# Patient Record
Sex: MALE | Race: WHITE
[De-identification: names, ages, dates, MRNs, and addresses within clinical notes are randomized per-mention and may not be internally consistent; named-entity substitution may affect disease eponyms.]

---

## 2018-05-20 ENCOUNTER — HOSPITAL ENCOUNTER (EMERGENCY)
Age: 28
Discharge: HOME | End: 2018-05-20

## 2018-05-20 ENCOUNTER — HOSPITAL ENCOUNTER (EMERGENCY)
Dept: HOSPITAL 91 - E/R | Age: 28
Discharge: HOME | End: 2018-05-20
Payer: COMMERCIAL

## 2018-05-20 DIAGNOSIS — J02.0: Primary | ICD-10-CM

## 2018-05-20 PROCEDURE — 99283 EMERGENCY DEPT VISIT LOW MDM: CPT

## 2019-04-11 ENCOUNTER — HOSPITAL ENCOUNTER (EMERGENCY)
Dept: HOSPITAL 91 - FTE | Age: 29
Discharge: LEFT BEFORE DISCHARGE | End: 2019-04-11
Payer: SELF-PAY

## 2019-04-11 ENCOUNTER — HOSPITAL ENCOUNTER (EMERGENCY)
Dept: HOSPITAL 10 - FTE | Age: 29
Discharge: LEFT BEFORE DISCHARGE | End: 2019-04-11
Payer: SELF-PAY

## 2019-04-11 VITALS
BODY MASS INDEX: 41.75 KG/M2 | HEIGHT: 73 IN | WEIGHT: 315 LBS | HEIGHT: 73 IN | BODY MASS INDEX: 41.75 KG/M2 | WEIGHT: 315 LBS

## 2019-04-11 VITALS — SYSTOLIC BLOOD PRESSURE: 141 MMHG | DIASTOLIC BLOOD PRESSURE: 64 MMHG | RESPIRATION RATE: 18 BRPM | HEART RATE: 88 BPM

## 2019-04-11 DIAGNOSIS — Z53.21: Primary | ICD-10-CM

## 2019-04-11 PROCEDURE — 93005 ELECTROCARDIOGRAM TRACING: CPT

## 2019-06-15 ENCOUNTER — HOSPITAL ENCOUNTER (EMERGENCY)
Dept: HOSPITAL 91 - E/R | Age: 29
LOS: 1 days | Discharge: HOME | End: 2019-06-16
Payer: COMMERCIAL

## 2019-06-15 ENCOUNTER — HOSPITAL ENCOUNTER (EMERGENCY)
Dept: HOSPITAL 10 - E/R | Age: 29
LOS: 1 days | Discharge: HOME | End: 2019-06-16
Payer: COMMERCIAL

## 2019-06-15 VITALS
BODY MASS INDEX: 41.34 KG/M2 | WEIGHT: 311.95 LBS | BODY MASS INDEX: 41.34 KG/M2 | WEIGHT: 311.95 LBS | HEIGHT: 73 IN | HEIGHT: 73 IN

## 2019-06-15 DIAGNOSIS — R10.13: Primary | ICD-10-CM

## 2019-06-15 PROCEDURE — 93005 ELECTROCARDIOGRAM TRACING: CPT

## 2019-06-15 PROCEDURE — 71046 X-RAY EXAM CHEST 2 VIEWS: CPT

## 2019-06-15 PROCEDURE — 99284 EMERGENCY DEPT VISIT MOD MDM: CPT

## 2019-06-16 VITALS — DIASTOLIC BLOOD PRESSURE: 72 MMHG | HEART RATE: 63 BPM | SYSTOLIC BLOOD PRESSURE: 122 MMHG | RESPIRATION RATE: 16 BRPM

## 2019-06-16 RX ADMIN — FAMOTIDINE 1 MG: 20 TABLET ORAL at 00:55

## 2019-06-16 RX ADMIN — ALUMINUM HYDROXIDE, MAGNESIUM HYDROXIDE, DIMETHICONE 1 ML: 200; 200; 20 SUSPENSION ORAL at 00:55

## 2019-06-16 NOTE — ERD
ER Documentation


Chief Complaint


Chief Complaint





LT CHEST PRESSURE AFTER EATING, LT SHOULDER BLADE STABBING PAIN X1 WEEK





HPI


There is a very pleasant 29-year-old male who presents to the emergency room 


describing chest discomfort.  He states that he has pressure-like chest 


discomfort when he eats a large meal.  He states similar episodes 2 days ago 


when he had some pizza.  Today when he had some tacos he noted some discomfort 


in his chest.  Patient denies any pleuritic pain, no exertional symptoms, no 


fevers chills or cough, no recent travel immobilization or calf swelling.  


Symptoms are mild proximally 1 out of 10 currently.





ROS


All systems reviewed and are negative except as per history of present illness.





Medications


Home Meds


Active Scripts


Omeprazole* (Omeprazole*) 20 Mg Capsule.dr, 20 MG PO DAILY for 30 Days


   Prov:CRYSTAL BARDALES MD         6/16/19


Ibuprofen* (Motrin*) 600 Mg Tab, 600 MG PO Q6, #30 TAB


   Prov:JODY LEON PA-C         5/20/18


Acetaminophen* (Tylophen*) 500 Mg Capsule, 1 CAP PO Q6H PRN for PAIN AND OR 


ELEVATED TEMP, #20 CAP


   Prov:JODY LEON PA-C         5/20/18


Amoxicillin* (Amoxicillin*) 500 Mg Cap, 500 MG PO TID for 10 Days, CAP


   Prov:JODY LEON PA-C         5/20/18





Allergies


Allergies:  


Coded Allergies:  


     No Known Drug Allergies (Verified  Allergy, Unknown, 4/11/19)





PMhx/Soc


Medical and Surgical Hx:  pt denies Medical Hx, pt denies Surgical Hx


Hx Alcohol Use:  No


Hx Substance Use:  No


Hx Tobacco Use:  No


Smoking Status:  Never smoker





FmHx


Family History:  No diabetes, No coronary disease





Physical Exam


Vitals





Vital Signs


  Date      Temp  Pulse  Resp  B/P (MAP)   Pulse Ox  O2          O2 Flow    FiO2


Time                                                 Delivery    Rate


   6/15/19  97.5     78    20      135/82        98


     23:34                           (99)





Physical Exam


General: Well developed, well nourished, no acute distress


Head: Normocephalic, atraumatic.


Eyes: Pupils equally reactive, EOM intact


ENT: Moist mucous membranes


Neck: Supple, no lymphadenopathy


Respiratory: Lungs clear bilaterally, no distress


Cardiovascular: RRR, no murmurs, rubs, or gallops


Abdominal: Soft, non-tender, non-distended, no peritoneal signs


: Deferred


MSK: No edema, no unilateral swelling, 5/5 strength


Neurologic: Alert and oriented, moving all extremities, normal speech, no focal 


weakness, no cerebellar signs


Skin: No rash


Psych: Normal mood


Results 24 hrs





Current Medications


 Medications
   Dose
          Sig/Dominique
       Start Time
   Status  Last


 (Trade)       Ordered        Route
 PRN     Stop Time              Admin
Dose


                              Reason                                Admin


 Famotidine
    20 mg          ONCE  STAT
    6/16/19       DC       



(Pepcid Iv)                   IV
            00:48



                                             6/16/19 00:51


                40 ml          ONCE  STAT
    6/16/19       DC           6/16/19


Miscellaneous                 PO
            00:48
                       00:55




 Medication
                                6/16/19 00:49


 (Gi Cocktail


(2))


 Famotidine
    20 mg          ONCE  ONCE
    6/16/19       DC           6/16/19


(Pepcid)                      PO
            01:00
                       00:55



                                             6/16/19 01:01








Procedures/MDM


EKG, MONITORS, & DIAGNOSTIC IMAGING:


EKG: I reviewed and interpreted a 12-lead EKG. 


Rhythm: Normal sinus rhythm


ST Changes: No contiguous ST segment elevations


T waves: No contiguous T wave inversions


Impression: No evidence of acute cardiac ischemia





Chest x-ray: I reviewed and interpreted a 2 view of the chest


Mediastinum: No enlargement


Cardiac silhouette: No cardiomegaly


Airspace: Clear lung fields bilaterally without evidence of pneumothorax


Bones: No evidence of fracture








MEDICAL DECISION MAKING:


Patient presents with postprandial chest discomfort.  This is very consistent 


with likely dyspepsia and reflux.  He only has the symptoms when he eats a large


bolus of food.  Patient exhibits no exertional symptoms.  He has no risk factors


for early cardiac disease other than body habitus but has no exertional 


symptoms.  Patient has no family history of early cardiac disease.  The patient 


meets the PERC RULE out criteria.  Thus, less than 2% risk of pulmonary embolism


with better alternative diagnosis.  No indication for d-dimer or CT pulmonary 


angiogram at this time.





I do not believe that laboratory testing or troponin is necessary given the 


patient's low risk profile.  Patient has very reproducible postprandial 


symptoms.





ER COURSE:


* GI cocktail provided.  Symptoms improved.  EKG and chest x-ray are 


  unremarkable.  The patient can be safely discharged.  A trial of PPI would be 


  reasonable.  Return precautions were discussed and understood.





CONSULTATION:


None





DISPOSITION PLAN:


The patient does not have an identifiable emergent medical condition that 


warrants inpatient hospitalization at this time.  The patient is deemed safe for


discharge with outpatient follow-up.





We discussed follow up with the patient's primary care doctor within 24 to 48 


hours as needed.  We also discussed return to the emergency room for worsening 


symptoms or worsening condition.





Outpatient referral: None required





Discharge Medications:


Prilosec





Departure


Diagnosis:  


   Primary Impression:  


   Dyspepsia


Condition:  Stable


Patient Instructions:  [facility] (Adult)


Referrals:  


[facility]


YOU HAVE RECEIVED A MEDICAL SCREENING EXAM AND THE RESULTS INDICATE THAT YOU DO 


NOT HAVE A CONDITION THAT REQUIRES URGENT TREATMENT IN THE EMERGENCY DEPARTMENT.





FURTHER EVALUATION AND TREATMENT OF YOUR CONDITION CAN WAIT UNTIL YOU ARE SEEN 


IN YOUR DOCTORS OFFICE WITHIN THE NEXT 1-2 DAYS. IT IS YOUR RESPONSIBILITY TO 


MAKE AN APPOINTMENT FOR FOLOW-UP CARE.





IF YOU HAVE A PRIMARY DOCTOR


--you should call your primary doctor and schedule an appointment





IF YOU DO NOT HAVE A PRIMARY DOCTOR YOU CAN CALL OUR PHYSICIAN REFERRAL HOTLINE 


AT


 [phone] 





IF YOU CAN NOT AFFORD TO SEE A PHYSICIAN YOU CAN CHOSE FROM THE FOLLOWING 


[facility] [phone] [address] [facility] [phone] [address] [facility]. [facility] [phone] [address] [facility] [phone] [address] [facility] [phone] [address] [facility]. [phone]


[address] [facility]. [facility]


YOU HAVE RECEIVED A MEDICAL SCREENING EXAM AND THE RESULTS INDICATE THAT YOU DO 


NOT HAVE A CONDITION THAT REQUIRES URGENT TREATMENT IN THE EMERGENCY DEPARTMENT.





FURTHER EVALUATION AND TREATMENT OF YOUR CONDITION CAN WAIT UNTIL YOU ARE SEEN 


IN YOUR DOCTORS OFFICE WITHIN THE NEXT 1-2 DAYS. IT IS YOUR RESPONSIBILITY TO 


MAKE AN APPOINTMENT FOR FOLOW-UP CARE.





IF YOU HAVE A PRIMARY DOCTOR


--you should call your primary doctor and schedule and appointment





IF YOU DO NOT HAVE A PRIMARY DOCTOR YOU CAN CALL OUR PHYSICIAN REFERRAL HOTLINE 


AT [phone].





IF YOU CAN NOT AFFORD TO SEE A PHYSICIAN YOU CAN CHOSE FROM THE FOLLOWING [facility]:





[facility]


[address] [place] [address]





[facility]


[address] [place] [address]





[facility]


[address] [place] [address]





Additional Instructions:  


Call your primary care doctor TOMORROW for an appointment during the next 1 


WEEK.Tell the  that you were referred from this facility.See the doctor


sooner or return here if your  condition worsens before your appointment time.











CRYSTAL BARDALES MD          Jun 16, 2019 02:35

## 2019-06-19 ENCOUNTER — HOSPITAL ENCOUNTER (EMERGENCY)
Dept: HOSPITAL 91 - FTE | Age: 29
Discharge: HOME | End: 2019-06-19
Payer: COMMERCIAL

## 2019-06-19 ENCOUNTER — HOSPITAL ENCOUNTER (EMERGENCY)
Dept: HOSPITAL 10 - FTE | Age: 29
Discharge: HOME | End: 2019-06-19
Payer: COMMERCIAL

## 2019-06-19 VITALS
HEIGHT: 73 IN | BODY MASS INDEX: 40.79 KG/M2 | WEIGHT: 307.77 LBS | WEIGHT: 307.77 LBS | BODY MASS INDEX: 40.79 KG/M2 | HEIGHT: 73 IN

## 2019-06-19 VITALS — DIASTOLIC BLOOD PRESSURE: 76 MMHG | RESPIRATION RATE: 16 BRPM | HEART RATE: 75 BPM | SYSTOLIC BLOOD PRESSURE: 131 MMHG

## 2019-06-19 DIAGNOSIS — R06.02: Primary | ICD-10-CM

## 2019-06-19 PROCEDURE — 71046 X-RAY EXAM CHEST 2 VIEWS: CPT

## 2019-06-19 PROCEDURE — 93005 ELECTROCARDIOGRAM TRACING: CPT

## 2019-06-19 PROCEDURE — 99284 EMERGENCY DEPT VISIT MOD MDM: CPT

## 2019-06-20 NOTE — ERD
ER Documentation


Chief Complaint


Chief Complaint





PT REPORTS SOB TODAY





HPI


This is a 29-year-old male presents to the ED complaining of shortness of breath


x1 day.  Patient states his shortness of breath is not exertional.  He states he


feels better when lying down.  He also states he has some associated 


palpitations and "chest discomfort".  He was seen here 4 days ago for chest 


discomfort and diagnosed with dyspepsia/gastritis.  He states he did not feel 


short of breath that time that today's symptoms are new.  He denies any history 


of anxiety or asthma.  Denies any wheezing.  Denies any diaphoresis.  Denies any


recent travel.  Denies any lower extremity swelling.  Denies any alcohol use, 


smoking or drugs.





ROS


All systems reviewed and are negative except as per history of present illness.





Medications


Home Meds


Active Scripts


Omeprazole* (Omeprazole*) 20 Mg Capsule.dr, 20 MG PO DAILY for 30 Days


   Prov:CRYSTAL BARDALES MD         6/16/19


Ibuprofen* (Motrin*) 600 Mg Tab, 600 MG PO Q6, #30 TAB


   Prov:JODY LEON PA-C         5/20/18


Acetaminophen* (Tylophen*) 500 Mg Capsule, 1 CAP PO Q6H PRN for PAIN AND OR E


LEVATED TEMP, #20 CAP


   Prov:JODY LEON PA-C         5/20/18


Amoxicillin* (Amoxicillin*) 500 Mg Cap, 500 MG PO TID for 10 Days, CAP


   Prov:JODY LEON PA-C         5/20/18





Allergies


Allergies:  


Coded Allergies:  


     No Known Drug Allergies (Verified  Allergy, Unknown, 4/11/19)





PMhx/Soc


Medical and Surgical Hx:  pt denies Medical Hx, pt denies Surgical Hx


Hx Alcohol Use:  No


Hx Substance Use:  No


Hx Tobacco Use:  No


Smoking Status:  Never smoker





Physical Exam


Vitals





Vital Signs


  Date      Temp  Pulse  Resp  B/P (MAP)   Pulse Ox  O2          O2 Flow    FiO2


Time                                                 Delivery    Rate


   6/19/19  98.0     75    16      131/76        98  Room Air


     22:14                           (94)


   6/19/19  98.6     86    18      147/77        99


     19:59                          (100)





Physical Exam


Const:   No acute distress


Head:   Atraumatic 


Eyes:    Normal Conjunctiva


ENT:    Normal External Ears, Nose and Mouth.


Neck:               Full range of motion. No meningismus.


Resp:   Clear to auscultation bilaterally


Cardio:   Regular rate and rhythm, no murmurs


Abd:    Soft, non tender, non distended. Normal bowel sounds


Skin:   No petechiae or rashes


Back:   No midline or flank tenderness


Ext:    No cyanosis, or edema


Neur:   Awake and alert


Psych:    Normal Mood and Affect





Procedures/MDM


LABS & DIAGNOSTIC IMAGING:


PROCEDURE:   XR Chest. 


 


CLINICAL INDICATION:  shortness of breath


 


TECHNIQUE:   Frontal and lateral views of the chest were obtained 


 


COMPARISON:   None 


 


FINDINGS:


There is no focal consolidation.


No pneumothorax or pleural effusion.


The heart and mediastinum are within normal limits.  


The bones and soft tissues are unremarkable.


 


IMPRESSION:


No acute cardiac or pulmonary findings.


 








PROCEDURES:


12-lead EKG interpretation as interpeted by Dr. Easley


Normal Sinus Rhythm with ventricular rate of 72 beats per minute


Normal axis


Normal intervals


No acute ST or T wave changes suggestive of acute ischemia or STEMI.











MEDICAL DECISION MAKING:





This is a 29-year-old male who presents for shortness of breath.  Vital signs 


are normal.  No hypoxia or respiratory distress.  Lung sounds are clear.  


Patient does not meet PERC criteria.  I have low suspicion for PE/DVT.  His EKG 


is unremarkable.  Chest x-ray also normal.  Symptoms could be related to 


anxiety.  I discussed this with the patient at bedside who agree with my 


assessment.  Patient was discharged home with copies of his reports and told to 


follow-up with his primary care physician who has an appointment with sometime 


next month.  Strict return precautions were discussed.








PRESCRIPTIONS: None








SPECIALIST FOLLOW UP RECOMMENDED: None


Patient has been advised to follow up with primary care in 1-2 days.





Departure


Diagnosis:  


   Primary Impression:  


   Shortness of breath


Condition:  Stable


Patient Instructions:  Coping with Shortness of Breath: Controlling Stress


Referrals:  


[facility] (PCP)





Additional Instructions:  


Take copies of your x-ray report and follow-up with your primary care provider 


in the next month as scheduled.  Return here for any new or worsening symptoms.











AVELINO BASURTO PA-C     Jun 20, 2019 08:36